# Patient Record
Sex: MALE | Race: WHITE | URBAN - NONMETROPOLITAN AREA
[De-identification: names, ages, dates, MRNs, and addresses within clinical notes are randomized per-mention and may not be internally consistent; named-entity substitution may affect disease eponyms.]

---

## 2020-03-21 ENCOUNTER — OUTSIDE SERVICES (OUTPATIENT)
Dept: PRIMARY CARE CLINIC | Age: 65
End: 2020-03-21

## 2020-03-21 PROCEDURE — 99305 1ST NF CARE MODERATE MDM 35: CPT | Performed by: FAMILY MEDICINE

## 2020-03-21 NOTE — PROGRESS NOTES
3/21/2020     Naveed Terry    : 1955 Sex: male   Age: 62 y.o. Chief Complaint   Patient presents with    Follow-Up from Hospital       HPI: A hospital follow-up evaluation and management of chronic medical problems for this 62y.o. year-old male resident. During diagnosis included sepsis syndrome, acute renal failure, rhabdomyolysis and cellulitis of lower extremities. The patient presented to the emergency room after falling through his bathroom floor. The patient was appropriately evaluated the emergency room and started on antibiotic therapy at that time. The patient states he is clinically improved. The patient was seen in consultation during hospital stay by infectious disease. Past medical history also includes hypertension, morbid obesity and diabetes. Social history independent living prior to hospitalization, the patient denies any tobacco, alcohol or drug use or abuse. Current medication list reviewed. The patient is tolerating all medications well without adverse events or known side effects. ROS:   Const: Denies changes in appetite, chills, fever, night sweats and weight loss. Eyes:  Denies discharge, a recent change in visual acuity, blurred vision and double vision. ENMT: Denies discharge of the ears, hearing loss, pain of the ears. Denies mouth or throat symptoms. CV:  Denies chest pain, dyspnea on exertion, orthopnea, palpitations and PND  Resp: Denies chest pain, cough, SOB and wheezing. GI: Denies abdominal pain, constipation, diarrhea, heartburn, indigestion, nausea and vomiting. : Denies dysuria, frequency, hematuria, nocturia and urgency. Musculo: Denies arthralgias and myalgia  Skin:  Denies lesions, pruritus and rash. Neuro: Denies dizziness, lightheadedness, numbness, tingling and weakness. Psych:  Denies anxiety and depression  Endocrine: Denies anxiety and depression.   Hema/Lymph: Denies hematologic symptoms  Allergy/Immuno:  Denies Resp: No rales, rhonchi, wheezes appreciated over the lungs bilaterally. CV: S1, S2 within normal limits. Regular rate and rhythm noted. Without murmur, gallop or rub. Extremities:  Pulses intact. Without noted edema. Abdomen: Positive bowel sounds. Palpation reveals softness, with no distension, organomegaly or tenderness. No abdominal masses palpable. Skin: Skin is warm and dry. Chronic skin changes of lower extremities noted upon examination. Musculo: Unremarkable upon examination  Neuro: Alert and oriented X3. Cranial nerves grossly intact. Psych: Mood is normal.  Affect is normal.   Vital signs reviewed. Visit Diagnoses       Codes    Sepsis, due to unspecified organism, unspecified whether acute organ dysfunction present [facility])    -  Primary A41.9    Acute renal failure, unspecified acute renal failure type ([facility] [address]     N17.9    Traumatic rhabdomyolysis, initial encounter ([facility]     T79. 6XXA           No flowsheet data found. Plan Per Assessment:  Raffi Morales was seen today for follow-up from hospital.    Diagnoses and all orders for this visit:    Sepsis, due to unspecified organism, unspecified whether acute organ dysfunction present [facility])    Acute renal failure, unspecified acute renal failure type [facility])    Traumatic rhabdomyolysis, initial encounter [facility])      Continue current medical therapy. Nursing staff to call with any noted change in clinical status. Return in about 1 week (around 3/28/2020) for MEDICATION CHECK, FOLLOW UP [id number] [facility] Katerina Slater MD    Note was generated with the assistance of voice recognition software. Document was reviewed however may contain grammatical errors.

## 2020-03-29 ENCOUNTER — OUTSIDE SERVICES (OUTPATIENT)
Dept: PRIMARY CARE CLINIC | Age: 65
End: 2020-03-29

## 2020-03-29 PROCEDURE — 99309 SBSQ NF CARE MODERATE MDM 30: CPT | Performed by: FAMILY MEDICINE

## 2020-03-29 NOTE — PROGRESS NOTES
Inability: Not on file    Transportation needs     Medical: Not on file     Non-medical: Not on file   Tobacco Use    Smoking status: Not on file   Substance and Sexual Activity    Alcohol use: Not on file    Drug use: Not on file    Sexual activity: Not on file   Lifestyle    Physical activity     Days per week: Not on file     Minutes per session: Not on file    Stress: Not on file   Relationships    Social connections     Talks on phone: Not on file     Gets together: Not on file     Attends Faith service: Not on file     Active member of club or organization: Not on file     Attends meetings of clubs or organizations: Not on file     Relationship status: Not on file    Intimate partner violence     Fear of current or ex partner: Not on file     Emotionally abused: Not on file     Physically abused: Not on file     Forced sexual activity: Not on file   Other Topics Concern    Not on file   Social History Narrative    Not on file     No past surgical history on file. No family history on file. There were no vitals filed for this visit. Exam: Const: Appears healthy and well developed. No signs of acute distress present. Eyes: PERRL  ENMT: Tympanic membranes are intact. Nasal mucosa intact without noted erythema Septum is in the midline. Posterior pharynx shows no exudate, irritation or redness. Neck:  Supple without adenopathy. Adequate range of motion   Resp: No rales, rhonchi, wheezes appreciated over the lungs bilaterally. CV: S1, S2 within normal limits. Regular rate and rhythm noted. Without murmur, gallop or rub. Extremities:  Pulses intact. Without noted edema. Abdomen: Positive bowel sounds. Palpation reveals softness, with no distension, organomegaly or tenderness. No abdominal masses palpable. Skin: Skin is warm and dry. Chronic vascular changes of lower extremities noted. Musculo: Unchanged upon examination. Neuro: Alert and oriented X3.   Cranial nerves grossly intact. Psych: Mood is normal.  Affect is normal.   Vital signs reviewed. Visit Diagnoses       Codes    Essential hypertension    -  Primary I10    Type 2 diabetes mellitus with other circulatory complication, without long-term current use of insulin ([facility]     E11.59           No flowsheet data found. Plan Per Assessment:  Claire Duval was seen today for diabetes, other and hypertension. Diagnoses and all orders for this visit:    Essential hypertension    Type 2 diabetes mellitus with other circulatory complication, without long-term current use of insulin [facility])      The patient insists that he is leaving [facility] tomorrow. Nursing staff advised to call with any further noted change in clinical status. Return in about 2 weeks (around 4/12/2020) for [address], MEDICATION CHECK. Yisroel Halsted, MD    Note was generated with the assistance of voice recognition software. Document was reviewed however may contain grammatical errors.

## 2020-03-31 ENCOUNTER — TELEPHONE (OUTPATIENT)
Dept: PRIMARY CARE CLINIC | Age: 65
End: 2020-03-31

## 2021-12-25 LAB
ANION GAP SERPL CALCULATED.3IONS-SCNC: 11 MEQ/L (ref 3–11)
BUN BLDV-MCNC: 47 MG/DL (ref 6–20)
CALCIUM SERPL-MCNC: 7 MG/DL (ref 8.5–10.5)
CHLORIDE BLD-SCNC: 110 MEQ/L (ref 98–107)
CO2: 16 MEQ/L (ref 21–31)
CREAT SERPL-MCNC: 3.2 MG/DL (ref 0.6–1.3)
CREATININE + EGFR PANEL: 24 ML/MIN
GFR NON-AFRICAN AMERICAN: 20 ML/MIN
GLUCOSE BLD-MCNC: 134 MG/DL (ref 70–99)
POTASSIUM SERPL-SCNC: 4.9 MEQ/L (ref 3.6–5)
SODIUM BLD-SCNC: 137 MEQ/L (ref 135–145)

## 2021-12-26 LAB
CHLORIDE URINE: 36 MEQ/L
CREATININE URINE: 89.1 MG/DL (ref 22–328)
POTASSIUM, URINE: 28.8 MEQ/L
PROTEIN/CREAT RATIO URINE RAN: 741 MG/G
SODIUM URINE: 44 MEQ/L
TOTAL PROTEIN, URINE: 66 MG/DL

## 2021-12-27 LAB — EOSINOPHIL, URINE: NORMAL %

## 2021-12-29 LAB — VITAMIN D 1,25-DIHYDROXY: 16.2 PG/ML (ref 19.9–79.3)

## 2021-12-30 LAB
25-HYDROXY VITAMIN D-2: 1.6 NG/ML
25-HYDROXY VITAMIN D-3: 6.4 NG/ML
VITAMIN D 25-HYDROXY: 8 NG/ML